# Patient Record
(demographics unavailable — no encounter records)

---

## 2024-10-30 NOTE — ASSESSMENT
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 Treatment with IVGG led to acute renal failure.  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 I explained that the neuropathy may not improve, but hopefully will stop progressing, with therapy. He has completed treatment with Bendamustine/Rituxan.   Plan: Observe CT Chest  CMP, LDH, SPEP, Quant Ig, SFLC Declines vaccinations- encouraged him to re-consider  RTC 3 months

## 2024-10-30 NOTE — RESULTS/DATA
[FreeTextEntry1] : WBC 4,530 Hgb 12.1 Hct 35.6 MCV 95.4 Platelets 234,000 Diff 76P 12L 8M 2Eo 1Ba ANC 3,430   10/01/24 CMP Glu 104 BUN 25   SFLC kappa 2.11 lambda 1.05 ratio 2.01   8/21/24 CMP normal  IgG 676 IgA 72 IgM 245  SFLC kappa 1.88 lambda 0.98 ratio 1.92   7/19/24 CMP normal  Uric acid 8.9 SPEP M-spike unable to quantitate, weak-gamma migrating paraprotein identified.  SPIEP weak IgM kappa band identified  IgG 816 IgM 298 IgA 86 SFLC kappa 1.68 lambda 1.17 ratio 1.44

## 2024-10-30 NOTE — PHYSICAL EXAM
[Restricted in physically strenuous activity but ambulatory and able to carry out work of a light or sedentary nature] : Status 1- Restricted in physically strenuous activity but ambulatory and able to carry out work of a light or sedentary nature, e.g., light house work, office work [Normal] : affect appropriate [de-identified] : RRR. S1S2 normal. Gr 2/6 ILDA RUSB and apex. No gallops. [de-identified] : Bilateral LE varicose veins

## 2024-10-30 NOTE — REVIEW OF SYSTEMS
[Diarrhea: Grade 0] : Diarrhea: Grade 0 [Difficulty Walking] : difficulty walking [Negative] : Allergic/Immunologic [Lower Ext Edema] : lower extremity edema [de-identified] : LE neuropathy

## 2024-10-30 NOTE — CONSULT LETTER
[Dear  ___] : Dear  [unfilled], [Courtesy Letter:] : I had the pleasure of seeing your patient, [unfilled], in my office today. [Please see my note below.] : Please see my note below. [Sincerely,] : Sincerely, [FreeTextEntry2] : Jos Inman MD [FreeTextEntry3] : Emanuel Akins M.D., FACP Professor of Medicine [facility] [employer] at [facility] Associate Chief, Division of Hematology [place] [id number] [phone]

## 2024-10-30 NOTE — ADDENDUM
[FreeTextEntry1] : I, Dilip Shelton, acted solely as a scribe for Dr. Emanuel Akins on 10/30/2024 . All medical entries made by the Scribe were at my, Dr. Emanuel Akins's, direction and personally dictated by me on 10/30/2024 . I have reviewed the chart and agree that the record accurately reflects my personal performance of the history, physical exam, assessment and plan. I have also personally directed, reviewed, and agreed with the chart.

## 2024-10-30 NOTE — HISTORY OF PRESENT ILLNESS
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 Has difficulty walking. Sees Neurology for this.  He notes no headaches visual problems, bleeding, bruising, chest pain, SOB, abdominal pain, swollen glands, fever, night sweats, skeletal pain. Weight is stable. Declines vaccinations.